# Patient Record
Sex: FEMALE | Race: WHITE
[De-identification: names, ages, dates, MRNs, and addresses within clinical notes are randomized per-mention and may not be internally consistent; named-entity substitution may affect disease eponyms.]

---

## 2018-11-22 ENCOUNTER — HOSPITAL ENCOUNTER (INPATIENT)
Dept: HOSPITAL 92 - SCSER | Age: 73
LOS: 2 days | Discharge: HOME | DRG: 66 | End: 2018-11-24
Attending: FAMILY MEDICINE | Admitting: FAMILY MEDICINE
Payer: MEDICARE

## 2018-11-22 VITALS — BODY MASS INDEX: 24 KG/M2

## 2018-11-22 DIAGNOSIS — I10: ICD-10-CM

## 2018-11-22 DIAGNOSIS — E78.5: ICD-10-CM

## 2018-11-22 DIAGNOSIS — M10.9: ICD-10-CM

## 2018-11-22 DIAGNOSIS — F03.90: ICD-10-CM

## 2018-11-22 DIAGNOSIS — I63.89: Primary | ICD-10-CM

## 2018-11-22 DIAGNOSIS — Z86.73: ICD-10-CM

## 2018-11-22 DIAGNOSIS — E11.9: ICD-10-CM

## 2018-11-22 DIAGNOSIS — R53.1: ICD-10-CM

## 2018-11-22 LAB
ALBUMIN SERPL BCG-MCNC: 4.3 G/DL (ref 3.4–4.8)
ALP SERPL-CCNC: 63 U/L (ref 40–150)
ALT SERPL W P-5'-P-CCNC: 11 U/L (ref 8–55)
ANION GAP SERPL CALC-SCNC: 14 MMOL/L (ref 10–20)
APTT PPP: 29.4 SEC (ref 22.9–36.1)
AST SERPL-CCNC: 13 U/L (ref 5–34)
BASOPHILS # BLD AUTO: 0.1 THOU/UL (ref 0–0.2)
BASOPHILS NFR BLD AUTO: 1 % (ref 0–1)
BILIRUB SERPL-MCNC: 0.6 MG/DL (ref 0.2–1.2)
BUN SERPL-MCNC: 20 MG/DL (ref 9.8–20.1)
CALCIUM SERPL-MCNC: 10 MG/DL (ref 7.8–10.44)
CHLORIDE SERPL-SCNC: 104 MMOL/L (ref 98–107)
CK MB SERPL-MCNC: 0.5 NG/ML (ref 0–6.6)
CK SERPL-CCNC: 29 U/L (ref 29–168)
CO2 SERPL-SCNC: 29 MMOL/L (ref 23–31)
CREAT CL PREDICTED SERPL C-G-VRATE: 0 ML/MIN (ref 70–130)
EOSINOPHIL # BLD AUTO: 0.2 THOU/UL (ref 0–0.7)
EOSINOPHIL NFR BLD AUTO: 2.3 % (ref 0–10)
GLOBULIN SER CALC-MCNC: 3.4 G/DL (ref 2.4–3.5)
GLUCOSE SERPL-MCNC: 192 MG/DL (ref 83–110)
HGB BLD-MCNC: 14.4 G/DL (ref 12–16)
INR PPP: 1
LYMPHOCYTES # BLD: 2.6 THOU/UL (ref 1.2–3.4)
LYMPHOCYTES NFR BLD AUTO: 30.9 % (ref 21–51)
MCH RBC QN AUTO: 29 PG (ref 27–31)
MCV RBC AUTO: 85.2 FL (ref 78–98)
MONOCYTES # BLD AUTO: 0.6 THOU/UL (ref 0.11–0.59)
MONOCYTES NFR BLD AUTO: 6.4 % (ref 0–10)
NEUTROPHILS # BLD AUTO: 5 THOU/UL (ref 1.4–6.5)
NEUTROPHILS NFR BLD AUTO: 59.4 % (ref 42–75)
PLATELET # BLD AUTO: 237 THOU/UL (ref 130–400)
POTASSIUM SERPL-SCNC: 4.2 MMOL/L (ref 3.5–5.1)
PROTHROMBIN TIME: 13.2 SEC (ref 12–14.7)
RBC # BLD AUTO: 4.96 MILL/UL (ref 4.2–5.4)
SODIUM SERPL-SCNC: 143 MMOL/L (ref 136–145)
TROPONIN I SERPL DL<=0.01 NG/ML-MCNC: (no result) NG/ML (ref ?–0.03)
WBC # BLD AUTO: 8.5 THOU/UL (ref 4.8–10.8)

## 2018-11-22 PROCEDURE — 70553 MRI BRAIN STEM W/O & W/DYE: CPT

## 2018-11-22 PROCEDURE — 82550 ASSAY OF CK (CPK): CPT

## 2018-11-22 PROCEDURE — 84484 ASSAY OF TROPONIN QUANT: CPT

## 2018-11-22 PROCEDURE — 93005 ELECTROCARDIOGRAM TRACING: CPT

## 2018-11-22 PROCEDURE — 80048 BASIC METABOLIC PNL TOTAL CA: CPT

## 2018-11-22 PROCEDURE — 85730 THROMBOPLASTIN TIME PARTIAL: CPT

## 2018-11-22 PROCEDURE — 85610 PROTHROMBIN TIME: CPT

## 2018-11-22 PROCEDURE — 36416 COLLJ CAPILLARY BLOOD SPEC: CPT

## 2018-11-22 PROCEDURE — 86850 RBC ANTIBODY SCREEN: CPT

## 2018-11-22 PROCEDURE — 86901 BLOOD TYPING SEROLOGIC RH(D): CPT

## 2018-11-22 PROCEDURE — 82553 CREATINE MB FRACTION: CPT

## 2018-11-22 PROCEDURE — 82607 VITAMIN B-12: CPT

## 2018-11-22 PROCEDURE — 82746 ASSAY OF FOLIC ACID SERUM: CPT

## 2018-11-22 PROCEDURE — 70496 CT ANGIOGRAPHY HEAD: CPT

## 2018-11-22 PROCEDURE — 80061 LIPID PANEL: CPT

## 2018-11-22 PROCEDURE — 85025 COMPLETE CBC W/AUTO DIFF WBC: CPT

## 2018-11-22 PROCEDURE — 36415 COLL VENOUS BLD VENIPUNCTURE: CPT

## 2018-11-22 PROCEDURE — 86900 BLOOD TYPING SEROLOGIC ABO: CPT

## 2018-11-22 PROCEDURE — 80053 COMPREHEN METABOLIC PANEL: CPT

## 2018-11-22 NOTE — CT
CT BRAIN WITHOUT CONTRAST

CT ANGIOGRAM OF THE HEAD WITH CONTRAST:

 

History: Stroke alert. 

 

Comparison: None. 

 

FINDINGS: 

CT of the brain was performed without intravenous contrast. Subsequently, CT angiogram of the head wa
s performed after the intravenous administration of contrast. 3D rendering is provided. 

 

On the noncontrast portion of the examination there is hemorrhage. There is extensive microvascular i
schemic changes. There is a hypodensity at the left anterior thalamus. This hypodensity appears relat
ively subacute. 

 

No midline shift. No mass effect. 

 

There is high grade narrowing of the right P2 segment of the posterior cerebral artery with distal re
constitution. 

 

Left vertebral artery is dominant. The basilar artery is patent. 

 

Both P1 segments are normal. Anterior cerebral arteries are patent. Middle cerebral arteries are martinez
nt. 

 

IMPRESSION: 

1. Likely subacute infarction of the left anterior thalamus. 

2. Moderate bronchovascular ischemic changes. 

3. High grade narrowing of the right P2 segment posterior cerebral artery  1.5 cm with adequate dista
l collateralization. 

 

Code CR. Dr. Rosario at 12:37 pm.

 

POS: [facility]

## 2018-11-22 NOTE — HP
DATE OF ADMISSION:  11/22/2018

 

CHIEF COMPLAINT:  The patient was at the baseline, doing her normal activities 
when she suddenly developed aphasia and also overall generalized weakness.

 

HISTORY OF PRESENTING ILLNESS:  This is a 73-year-old [place] patient with a 
past medical history that is significant for hypertension, diabetes and 
repeated cardiovascular accident.  She had just received TPA a few months back 
subsequently patient has no neurological deficits. In the morning patient got 
up around 9:00,, ate her breakfast, took her medications, used the restroom, 
but patient was not speaking and was weak.  Subsequently, the patient's 
caregiver noted that the patient was not moving her extremities. The patient 
was then brought to the ER and a CTA of the head in the ER showed subacute 
infarction of the subthalamic area.  Subsequently slowly, the weakness in the 
ER completely resolved.  The patient was able to move her both upper and lower 
extremities, but remained aphasic.  The decision was made not to use any TPA as 
patient was improving.  The patient was then transferred to the [facility] for further evaluation and treatment.

 

PAST MEDICAL HISTORY:

1.  Diabetes.

2.  Hypertension.

3.  Gout.

4.  Hyperlipidemia.

5.  Dementia.

6.  History of multiple cerebrovascular accidents in the past with TPA been 
given during the previous time.

 

PAST SURGICAL HISTORY:  No significant surgical history.

 

PSYCHIATRIC HISTORY:  No previous psychiatric history.

 

SOCIAL HISTORY:  The patient denies any alcohol, drug or smoking history.

 

ALLERGIES:  The patient has no known drug allergies.

 

REVIEW OF SYSTEMS:  Unobtainable as the patient is aphasic right now, but other 
than aphasia and the weakness with which she presented, rest of the review of 
systems is within normal limits.

 

PHYSICAL EXAMINATION:

VITAL SIGNS:  /92, pulse 79, respiratory rate 16, O2 sat 98.

HEENT:  Normocephalic, atraumatic head.  Eyelids and conjunctivae are within 
normal limits.  Pupils are round and reactive to light.  External ears and 
canals are within normal limits.  No discharge.  No signs of sinusitis.

RESPIRATORY AND CHEST:  The chest wall is moving symmetrically.  No accessory 
muscles are used.  Air entry into both the lung fields are equal.  No wheezes, 
no rhonchi.

CARDIOVASCULAR:  Regular rate and rhythm.  No murmurs, no gallops.

ABDOMEN:  Soft, NT, ND.  +4 bowel sounds.

NEUROLOGIC:  The patient is aphasic, cranial nerves are intact, no focal 
deficits other than aphasia, no motor weakness, no loss of sensation.

MUSCULOSKELETAL:  No swelling of the joints.  No discoloration.  No pedal edema.

 

ASSESSMENT AND PLAN:

1.  Cardiovascular accident:  CTA of the head showing subacute left thalamic 
stroke, aphasia, no other residual weakness is noted.  History of recent TPA.  
Continue aspirin and statin with home medication and obtain an MRI to further 
evaluate the infarction, stroke labs, vitamin B12, TSH and folic acid will be 
obtained. PT/OT/Speech

2.  Hypertension:  Continue the home medications.

3.  Diabetes:  Continue home medication, metformin.

4.  If MRI does not show anything new, the patient will be discharged home soon.



Dr. Ann-Marie Merrill

[phone]

 

[facility]

## 2018-11-23 LAB
ANION GAP SERPL CALC-SCNC: 15 MMOL/L (ref 10–20)
BUN SERPL-MCNC: 18 MG/DL (ref 9.8–20.1)
CALCIUM SERPL-MCNC: 9.6 MG/DL (ref 7.8–10.44)
CHD RISK SERPL-RTO: 4.2 (ref ?–4.5)
CHLORIDE SERPL-SCNC: 106 MMOL/L (ref 98–107)
CHOLEST SERPL-MCNC: 146 MG/DL
CO2 SERPL-SCNC: 25 MMOL/L (ref 23–31)
CREAT CL PREDICTED SERPL C-G-VRATE: 57 ML/MIN (ref 70–130)
GLUCOSE SERPL-MCNC: 98 MG/DL (ref 83–110)
HDLC SERPL-MCNC: 35 MG/DL
LDLC SERPL CALC-MCNC: 84 MG/DL
POTASSIUM SERPL-SCNC: 3.6 MMOL/L (ref 3.5–5.1)
SODIUM SERPL-SCNC: 142 MMOL/L (ref 136–145)
TRIGL SERPL-MCNC: 135 MG/DL (ref ?–150)
VIT B12 SERPL-MCNC: 147 PG/ML (ref 211–911)

## 2018-11-23 RX ADMIN — INSULIN LISPRO PRN UNIT: 100 INJECTION, SOLUTION INTRAVENOUS; SUBCUTANEOUS at 11:14

## 2018-11-23 NOTE — CON
DATE OF CONSULTATION:  11/23/2018

 

CONSULTING PHYSICIAN:  Hospitalist Service.

 

IMPRESSION:

1.  Transient dizziness possibly secondary to a peripheral vertigo.

2.  Dementia.

3.  Hypertension.

4.  Diabetes.

5.  History of transient ischemic attack.

 

PLAN:  The patient can be discharged home for outpatient followup at the [facility].

 

HISTORY OF PRESENT ILLNESS:  Ms. Queen is a 73-year-old [place] woman whose primary language is Alh[place]
jacob.  She was at home under her 's care.  He noted that she seemed to get a generally weak an
d complained of being dizzy.  He tried to get her up from the couch and had difficulty doing so.  Whe
n he picked up her arms, she reports that both of them seemed to be weak.  She was transported to the
 emergency room for evaluation.  She and her  reports that her symptoms seemed to resolve in a
bout 2-3 hours.  She had a CT angiogram which suggested the possibility of a subacute infarction invo
lving the left anterior thalamus.  Followup MRI of the brain does not reveal any evidence of an acute
 ischemic event.  She has some minimal small vessel ischemic changes.

 

LABORATORY STUDIES:  Showed unremarkable CBC, coags, and chemistry panel.  Her blood sugars were over
 100.  Her B12 level was low at 147.  Folate was in normal range.

 

PAST MEDICAL HISTORY:  As listed above.

 

ALLERGIES:  None.

 

SOCIAL HISTORY:  No tobacco or alcohol use.

 

FAMILY HISTORY:  Noncontributory.

 

REVIEW OF SYSTEMS:  She has no particular complaints at this point.

 

PHYSICAL EXAMINATION:

VITAL SIGNS:  Blood pressure 172/89, pulse 74, respirations 16, temperature 98.7.  Orthostatic measur
ements showed a 20 mm drop in pressure down from 126-103.

HEENT:  Pupils equal and reactive.  Conjunctivae clear.  Oropharynx clear.

NECK:  Supple, no lymphadenopathy.

EXTREMITIES:  No cyanosis.

NEUROLOGIC:  She was alert and relatively cooperative.  Her  had to speak to her in primary la
nguage to get her to answer questions.  There is no dysarthria noted.  No facial asymmetry was presen
t.  Motor exam showed equal strength.  No abnormal movements were seen.  She has been up to the bathr
oom earlier today.

 

SUMMARY:  This is a 73-year-old woman with a past history of dementia who presented with some general
ized weakness and diminished speech output.  She appears to be a bit orthostatic, although the pressu
res are still in normal range.  Her workup thus far has been unremarkable other than the B12 level wh
ich needs to be addressed with supplementation.  I do not see any further neurologic workup that need
s to be done at this point.  I will be happy to follow up with her as an outpatient.

## 2018-11-23 NOTE — PDOC.PN
- Subjective


Encounter Start Date: 11/23/18


Encounter Start Time: 09:30


Subjective: pt up in bed was dizzy this am





- Objective


Resuscitation Status: 


 











Resuscitation Status           FULL:Full Resuscitation














Vital Signs & Weight: 


 Vital Signs (12 hours)











  Temp Pulse Pulse Pulse Resp BP BP


 


 11/23/18 11:08  98.7 F  74    16  


 


 11/23/18 09:09    68  76   126/78  103/73


 


 11/23/18 09:00    68  76   126/78  103/73


 


 11/23/18 07:40  98.5 F  69    20  


 


 11/23/18 04:00  97.5 F L  71    16  














  BP Pulse Ox


 


 11/23/18 11:08  172/89 H  92 L


 


 11/23/18 09:09  


 


 11/23/18 09:00  


 


 11/23/18 07:40  150/80 H  92 L


 


 11/23/18 04:00  166/75 H  96








 Weight











Weight                         127 lb 8 oz














I&O: 


 











 11/22/18 11/23/18 11/24/18





 06:59 06:59 06:59


 


Intake Total  150 600


 


Balance  150 600











Result Diagrams: 


 11/22/18 12:15





 11/23/18 03:31


Additional Labs: 


 Accuchecks











  11/23/18 11/22/18





  05:20 20:19


 


POC Glucose  111 H  119 H














Phys Exam





- Physical Examination


Neck: no nodes, no JVD, supple, full ROM


Respiratory: no wheezing, no rales, no rhonchi, wheezing present, clear to 

auscultation bilateral


Cardiovascular: RRR, no significant murmur, no rub, gallop, irregular


Gastrointestinal: soft, non-tender, no distention, positive bowel sounds


Musculoskeletal: no edema, pulses present, edema present


pt is slow to respond, mild weakness to right upper and lower ext





Dx/Plan


(1) Acute ischemic stroke


Code(s): I63.9 - CEREBRAL INFARCTION, UNSPECIFIED   Status: Acute   





(2) HTN (hypertension)


Code(s): I10 - ESSENTIAL (PRIMARY) HYPERTENSION   Status: Acute   





(3) Diabetes


Code(s): E11.9 - TYPE 2 DIABETES MELLITUS WITHOUT COMPLICATIONS   Status: Acute

   





- Plan


pt has has multiple strokes is on asa/plavix


-: will get records from [facility], nurse notified


-: cta indicates high grade stenosis pca will consult 


-: neurosurgery for possible any intervention, also consult neurology


-: mri ordered. pt had recent echo.





* .








Review of Systems





- Review of Systems


Respiratory: negative: Cough, Dry, Shortness of Breath, Hemoptysis, SOB with 

Excertion, Pleuritic Pain, Sputum, Wheezing


Cardiovascular: negative: chest pain, palpitations, orthopnea, paroxysmal 

nocturnal dyspnea, edema, light headedness, other


Gastrointestinal: negative: Nausea, Vomiting, Abdominal Pain, Diarrhea, 

Constipation, Melena, Hematochezia, Other





- Medications/Allergies


Allergies/Adverse Reactions: 


 Allergies











Allergy/AdvReac Type Severity Reaction Status Date / Time


 


No Known Allergies Allergy   Verified 11/22/18 16:10











Medications: 


 Current Medications





Aspirin (Aspirin Chewable)  81 mg PO DAILY [facility]


   Last Admin: 11/23/18 10:01 Dose:  81 mg


Clopidogrel Bisulfate (Plavix)  75 mg PO DAILY [facility]


Cyanocobalamin (Vitamin B-12)  1,000 mcg IM DAILY [facility]


   Stop: 11/25/18 10:00


Cyanocobalamin (Vitamin B-12)  1,000 mcg PO DAILY [facility]


Dextrose/Water (Dextrose 50%)  25 gm IVP PRN PRN


   PRN Reason: HYPOGLYCEMIA PROTOCOL


Enoxaparin Sodium (Lovenox)  40 mg SC 0900 [facility]


   Last Admin: 11/23/18 10:01 Dose:  40 mg


Glucagon (Glucagon)  1 mg IM PRN PRN


   PRN Reason: HYPOGLYCEMIA PROTOCOL


Dextrose/Water (D5w)  1,000 mls @ 0 mls/hr IV INF PRN


   PRN Reason: HYPOGLYCEMIA PROTOCOL


Insulin Human Lispro (Humalog)  0 units SC .MILD SLIDING SCALE PRN; Protocol


   PRN Reason: MILD SLIDING SCALE


   Last Admin: 11/23/18 11:14 Dose:  2 unit


Losartan Potassium (Cozaar)  100 mg PO DAILY [facility]


Metformin HCl (Glucophage)  500 mg PO BID-[facility]


Simvastatin (Zocor)  20 mg PO HS [facility]

## 2018-11-23 NOTE — MRI
MRI BRAIN WITH AND WITHOUT CONTRAST IV CONTRAST:

 

HISTORY: 

Stroke, dizziness.  Right-sided weakness.

 

FINDINGS: 

Correlation is made with the CT and CTA of previous day.

 

Multiple foci of T2 prolongation in the periventricular white matter consistent with chronic small-ve
ssel ischemic disease.  No restricted diffusion is seen.  There are a few foci of signal hypointensit
y on the gradient echo sequences consistent with old hemorrhage/hemosiderin deposits.  No evidence of
 acute infarct, acute hemorrhage, mass, midline shift, or abnormal extraaxial fluid collections is se
en.  The ventricular size is appropriate and the basilar cisterns patent.  No abnormal post contrast 
enhancement is seen. There is mucosal disease in the paranasal sinuses.  There is fluid in the left m
astoid air cells.

 

IMPRESSION: 

Chronic changes.  No acute process or intracranial mass.

 

POS: [facility]

## 2018-11-24 VITALS — SYSTOLIC BLOOD PRESSURE: 145 MMHG | DIASTOLIC BLOOD PRESSURE: 79 MMHG | TEMPERATURE: 98.4 F

## 2018-11-24 RX ADMIN — INSULIN LISPRO PRN UNIT: 100 INJECTION, SOLUTION INTRAVENOUS; SUBCUTANEOUS at 13:28

## 2018-11-24 NOTE — PRG
DATE OF SERVICE:  11/24/2018

 

This is a 30-minute initial hospital visit note, of which 30 minutes were spent in review of imaging 
and record, evaluation and examination of the patient, formulation of a plan.  Greater than 50% of [facility] time was spent in counseling on Velma Queen.

 

Ms. Queen was admitted for concern of stroke.  MRI demonstrates left thalamic stroke.  This is not po
sitive on diffusion restriction and likely is older in a subacute to chronic fashion.  Nevertheless, 
CTA demonstrates some diminutive nature of the left P2 artery.  This may be longstanding frankly.  Marek scott is already on a baby aspirin and Plavix.  I would recommend increase to a full-strength aspirin and
 Plavix, and I have discussed this with her and her family.  Evidently, Neurology is also seeing the 
patient, so I would defer to them.  She certainly does not need any further neurosurgical interventio
n or followup.

 

DIAGNOSIS:  Left thalamic stroke with PCA hypoplasia versus stenosis.

## 2018-11-24 NOTE — PDOC.PN
- Subjective


Encounter Start Date: 11/24/18


Encounter Start Time: 10:00


Subjective: pt up walking 





- Objective


Resuscitation Status: 


 











Resuscitation Status           FULL:Full Resuscitation














Vital Signs & Weight: 


 Vital Signs (12 hours)











  Temp Pulse Resp BP Pulse Ox


 


 11/24/18 08:00      96


 


 11/24/18 07:57  97.9 F  66  16  148/77 H  96


 


 11/24/18 03:30  97.5 F L  64  16  153/79 H  95








 Weight











Weight                         127 lb 8 oz














I&O: 


 











 11/23/18 11/24/18 11/25/18





 06:59 06:59 06:59


 


Intake Total 150 950 50


 


Balance 150 950 50











Result Diagrams: 


 11/22/18 12:15





 11/23/18 03:31


Additional Labs: 


 Accuchecks











  11/24/18 11/24/18 11/23/18





  12:25 05:58 20:37


 


POC Glucose  176 H  147 H  169 H














  11/23/18 11/23/18





  16:50 10:45


 


POC Glucose  116 H  169 H














Phys Exam





- Physical Examination


Neck: no nodes, no JVD, supple, full ROM


Respiratory: no wheezing, no rales, no rhonchi, wheezing present, clear to 

auscultation bilateral


Cardiovascular: RRR, no significant murmur, no rub, gallop, irregular


Gastrointestinal: soft, non-tender, no distention, positive bowel sounds


Musculoskeletal: no edema, pulses present, edema present





Dx/Plan


(1) Acute ischemic stroke


Code(s): I63.9 - CEREBRAL INFARCTION, UNSPECIFIED   Status: Acute   





(2) HTN (hypertension)


Code(s): I10 - ESSENTIAL (PRIMARY) HYPERTENSION   Status: Acute   





(3) Diabetes


Code(s): E11.9 - TYPE 2 DIABETES MELLITUS WITHOUT COMPLICATIONS   Status: Acute

   





- Plan





* .